# Patient Record
Sex: FEMALE | Race: WHITE | NOT HISPANIC OR LATINO | Employment: OTHER | URBAN - METROPOLITAN AREA
[De-identification: names, ages, dates, MRNs, and addresses within clinical notes are randomized per-mention and may not be internally consistent; named-entity substitution may affect disease eponyms.]

---

## 2017-01-14 ENCOUNTER — HOSPITAL ENCOUNTER (EMERGENCY)
Facility: HOSPITAL | Age: 82
Discharge: HOME | End: 2017-01-14
Attending: EMERGENCY MEDICINE
Payer: MEDICARE

## 2017-01-14 VITALS
DIASTOLIC BLOOD PRESSURE: 50 MMHG | HEART RATE: 84 BPM | TEMPERATURE: 99 F | WEIGHT: 130 LBS | SYSTOLIC BLOOD PRESSURE: 112 MMHG | HEIGHT: 65 IN | OXYGEN SATURATION: 98 % | RESPIRATION RATE: 26 BRPM | BODY MASS INDEX: 21.66 KG/M2

## 2017-01-14 DIAGNOSIS — Z20.828 EXPOSURE TO INFLUENZA: ICD-10-CM

## 2017-01-14 DIAGNOSIS — N28.9 RENAL INSUFFICIENCY: ICD-10-CM

## 2017-01-14 DIAGNOSIS — I48.20 CHRONIC ATRIAL FIBRILLATION: ICD-10-CM

## 2017-01-14 DIAGNOSIS — R11.2 NON-INTRACTABLE VOMITING WITH NAUSEA, UNSPECIFIED VOMITING TYPE: Primary | ICD-10-CM

## 2017-01-14 DIAGNOSIS — R05.9 COUGH: ICD-10-CM

## 2017-01-14 DIAGNOSIS — J81.1 CHRONIC PULMONARY EDEMA: ICD-10-CM

## 2017-01-14 DIAGNOSIS — D64.9 ANEMIA, UNSPECIFIED TYPE: ICD-10-CM

## 2017-01-14 DIAGNOSIS — R19.7 DIARRHEA, UNSPECIFIED TYPE: ICD-10-CM

## 2017-01-14 LAB
ALBUMIN SERPL BCP-MCNC: 3.2 G/DL
ALP SERPL-CCNC: 201 U/L
ALT SERPL W/O P-5'-P-CCNC: 10 U/L
ANION GAP SERPL CALC-SCNC: 12 MMOL/L
AST SERPL-CCNC: 18 U/L
BASOPHILS # BLD AUTO: 0.01 K/UL
BASOPHILS NFR BLD: 0.1 %
BILIRUB SERPL-MCNC: 1.3 MG/DL
BILIRUB UR QL STRIP: NEGATIVE
BUN SERPL-MCNC: 28 MG/DL
CALCIUM SERPL-MCNC: 9 MG/DL
CHLORIDE SERPL-SCNC: 102 MMOL/L
CLARITY UR: CLEAR
CO2 SERPL-SCNC: 24 MMOL/L
COLOR UR: YELLOW
CREAT SERPL-MCNC: 1.4 MG/DL
DIFFERENTIAL METHOD: ABNORMAL
EOSINOPHIL # BLD AUTO: 0 K/UL
EOSINOPHIL NFR BLD: 0 %
ERYTHROCYTE [DISTWIDTH] IN BLOOD BY AUTOMATED COUNT: 15.9 %
EST. GFR  (AFRICAN AMERICAN): 39 ML/MIN/1.73 M^2
EST. GFR  (NON AFRICAN AMERICAN): 34 ML/MIN/1.73 M^2
FLUAV AG SPEC QL IA: NEGATIVE
FLUBV AG SPEC QL IA: NEGATIVE
GLUCOSE SERPL-MCNC: 173 MG/DL
GLUCOSE UR QL STRIP: NEGATIVE
HCT VFR BLD AUTO: 34.2 %
HGB BLD-MCNC: 11.2 G/DL
HGB UR QL STRIP: NEGATIVE
KETONES UR QL STRIP: NEGATIVE
LEUKOCYTE ESTERASE UR QL STRIP: NEGATIVE
LYMPHOCYTES # BLD AUTO: 0.2 K/UL
LYMPHOCYTES NFR BLD: 2 %
MAGNESIUM SERPL-MCNC: 2.2 MG/DL
MCH RBC QN AUTO: 27.3 PG
MCHC RBC AUTO-ENTMCNC: 32.7 %
MCV RBC AUTO: 83 FL
MONOCYTES # BLD AUTO: 0.4 K/UL
MONOCYTES NFR BLD: 3.3 %
NEUTROPHILS # BLD AUTO: 10 K/UL
NEUTROPHILS NFR BLD: 94.6 %
NITRITE UR QL STRIP: NEGATIVE
PH UR STRIP: 6 [PH] (ref 5–8)
PLATELET # BLD AUTO: 210 K/UL
PMV BLD AUTO: 9.5 FL
POTASSIUM SERPL-SCNC: 4.3 MMOL/L
PROT SERPL-MCNC: 6.5 G/DL
PROT UR QL STRIP: NEGATIVE
RBC # BLD AUTO: 4.1 M/UL
SODIUM SERPL-SCNC: 138 MMOL/L
SP GR UR STRIP: 1.01 (ref 1–1.03)
SPECIMEN SOURCE: NORMAL
TROPONIN I SERPL DL<=0.01 NG/ML-MCNC: 0.02 NG/ML
URN SPEC COLLECT METH UR: NORMAL
UROBILINOGEN UR STRIP-ACNC: NEGATIVE EU/DL
WBC # BLD AUTO: 10.61 K/UL

## 2017-01-14 PROCEDURE — 25000003 PHARM REV CODE 250: Performed by: EMERGENCY MEDICINE

## 2017-01-14 PROCEDURE — 63600175 PHARM REV CODE 636 W HCPCS: Performed by: EMERGENCY MEDICINE

## 2017-01-14 PROCEDURE — 96361 HYDRATE IV INFUSION ADD-ON: CPT

## 2017-01-14 PROCEDURE — 96374 THER/PROPH/DIAG INJ IV PUSH: CPT

## 2017-01-14 PROCEDURE — 85025 COMPLETE CBC W/AUTO DIFF WBC: CPT

## 2017-01-14 PROCEDURE — 87400 INFLUENZA A/B EACH AG IA: CPT

## 2017-01-14 PROCEDURE — 83735 ASSAY OF MAGNESIUM: CPT

## 2017-01-14 PROCEDURE — 93010 ELECTROCARDIOGRAM REPORT: CPT | Mod: ,,, | Performed by: INTERNAL MEDICINE

## 2017-01-14 PROCEDURE — 84484 ASSAY OF TROPONIN QUANT: CPT

## 2017-01-14 PROCEDURE — 80053 COMPREHEN METABOLIC PANEL: CPT

## 2017-01-14 PROCEDURE — 99285 EMERGENCY DEPT VISIT HI MDM: CPT | Mod: 25

## 2017-01-14 PROCEDURE — 81003 URINALYSIS AUTO W/O SCOPE: CPT

## 2017-01-14 PROCEDURE — 93005 ELECTROCARDIOGRAM TRACING: CPT

## 2017-01-14 RX ORDER — CALCIUM CARB/VITAMIN D3/VIT K1 500-500-40
400 TABLET,CHEWABLE ORAL DAILY
COMMUNITY

## 2017-01-14 RX ORDER — ONDANSETRON 4 MG/1
4 TABLET, ORALLY DISINTEGRATING ORAL EVERY 6 HOURS PRN
Qty: 12 TABLET | Refills: 0 | Status: SHIPPED | OUTPATIENT
Start: 2017-01-14

## 2017-01-14 RX ORDER — OSELTAMIVIR PHOSPHATE 30 MG/1
30 CAPSULE ORAL DAILY
Qty: 10 CAPSULE | Refills: 0 | Status: SHIPPED | OUTPATIENT
Start: 2017-01-14 | End: 2017-01-24

## 2017-01-14 RX ORDER — ONDANSETRON 2 MG/ML
4 INJECTION INTRAMUSCULAR; INTRAVENOUS
Status: COMPLETED | OUTPATIENT
Start: 2017-01-14 | End: 2017-01-14

## 2017-01-14 RX ORDER — POTASSIUM CHLORIDE 20 MEQ/1
20 TABLET, EXTENDED RELEASE ORAL DAILY
COMMUNITY

## 2017-01-14 RX ORDER — FLUOXETINE 10 MG/1
10 CAPSULE ORAL DAILY
COMMUNITY

## 2017-01-14 RX ORDER — SODIUM CHLORIDE 9 MG/ML
1000 INJECTION, SOLUTION INTRAVENOUS
Status: DISCONTINUED | OUTPATIENT
Start: 2017-01-14 | End: 2017-01-14

## 2017-01-14 RX ADMIN — ONDANSETRON 4 MG: 2 INJECTION INTRAMUSCULAR; INTRAVENOUS at 10:01

## 2017-01-14 RX ADMIN — SODIUM CHLORIDE 500 ML: 0.9 INJECTION, SOLUTION INTRAVENOUS at 10:01

## 2017-01-14 NOTE — ED PROVIDER NOTES
SCRIBE #1 NOTE: IDorita, am scribing for, and in the presence of, Lita Carey DO. I have scribed the entire note.      History      Chief Complaint   Patient presents with    Nausea     vomiting and diarrhea x 1 day        Review of patient's allergies indicates:   Allergen Reactions    Codeine     Iodinated contrast media - iv dye         HPI   HPI    1/14/2017, 9:59 AM   History obtained from the adult caretaker, daughter and patient      History of Present Illness: Christy Palma is a 86 y.o. female patient who presents to the Emergency Department for N/V/D which onset suddenly at 1:45 am this morning. Daughter reports that patient had episodes of N/V/D every hour until 7:45 am this morning. Daughter describes the emesis as dark vomit. She reports that patient has a PMHx of C. Difficile and reports that patient recently finished a round of Bactrim for UTI. Daughter states that patient was exposed to the flu yesterday by family members. Patient has not received a flu vaccination. Symptoms are episodic and moderate in severity. No modifying factors reported. Daughter reports patient had tympanic temperatures of 99.4 and 100 this morning.  She had an episode of coughing after vomiting.  Pt and daughter report a cough but daughter denies chills, hematemesis, hematochezia, myalgias, HA, abd pain, CP, SOB, extremity weakness/numbness, and all other sxs at this time. No further complaints or concerns at this time.       Arrival mode: Personal vehicle    PCP: See Pollard MD       Past Medical History:  Past Medical History   Diagnosis Date    ASD (atrial septal defect)     Atrial fibrillation     Guillain-[place]     Hypertension     Lymphedema        Past Surgical History:  Past Surgical History   Procedure Laterality Date    Colectomy      Cholecystectomy      Hysterectomy      Cardiac surgery      Cardiac pacemaker placement           Family History:  History reviewed. No pertinent family  history.    Social History:  Social History     Social History Main Topics    Smoking status: Never Smoker    Smokeless tobacco: Unknown    Alcohol use No    Drug use: No    Sexual activity: Unknown       ROS   Review of Systems   Constitutional: Positive for fever (lowgrade, resolved). Negative for chills.   HENT: Negative for sore throat.    Respiratory: Positive for cough. Negative for shortness of breath.    Cardiovascular: Negative for chest pain.   Gastrointestinal: Positive for diarrhea, nausea and vomiting. Negative for abdominal pain, blood in stool and constipation.   Genitourinary: Negative for dysuria.   Musculoskeletal: Negative for back pain and myalgias.   Skin: Negative for rash.   Neurological: Negative for weakness, numbness and headaches.   Hematological: Does not bruise/bleed easily.   All other systems reviewed and are negative.      Physical Exam    Initial Vitals   BP Pulse Resp Temp SpO2   01/14/17 0943 01/14/17 0943 01/14/17 0943 01/14/17 0943 01/14/17 0943   126/76 114 18 98.5 °F (36.9 °C) 96 %      Physical Exam  Nursing Notes and Vital Signs Reviewed.  Constitutional: Patient is in no acute distress. Awake and alert. Well-developed and well-nourished.  Head: Atraumatic. Normocephalic.  Eyes: PERRL. EOM intact. Conjunctivae are not pale. No scleral icterus.  ENT: Mucous membranes are dry Oropharynx is clear and symmetric.    Neck: Supple. Full ROM. No lymphadenopathy.  Cardiovascular: Regular rate. Irregularly irregular rhythm with a murmur. No rubs or gallops. Distal pulses are 2+ and symmetric.  Pulmonary/Chest: No respiratory distress. Clear to auscultation bilaterally. No wheezing, rales, or rhonchi.  Abdominal: Soft and non-distended.  There is no tenderness.  No rebound, guarding, or rigidity.  Good bowel sounds.  Anasarca is located to the abdomen as well as lower extremities.  Genitourinary: No CVA tenderness  Musculoskeletal: Chronic deformity to RLE. No edema. No calf  "tenderness.  Skin: Warm and dry.  Neurological:  Alert, awake, and appropriate.  Normal speech.  No acute focal neurological deficits are appreciated.  Psychiatric: Normal affect. Good eye contact. Appropriate in content.    ED Course    Procedures  ED Vital Signs:  Vitals:    01/14/17 0943 01/14/17 0947 01/14/17 1033 01/14/17 1203   BP: 126/76  (!) 132/57 (!) 112/50   Pulse: (!) 114  83 84   Resp: 18  19 (!) 26   Temp: 98.5 °F (36.9 °C)      TempSrc: Oral      SpO2: 96%  99% 98%   Weight:  59 kg (130 lb)     Height:  5' 5" (1.651 m)         Abnormal Lab Results:  Labs Reviewed   CBC W/ AUTO DIFFERENTIAL - Abnormal; Notable for the following:        Result Value    Hemoglobin 11.2 (*)     Hematocrit 34.2 (*)     RDW 15.9 (*)     Gran # 10.0 (*)     Lymph # 0.2 (*)     Gran% 94.6 (*)     Lymph% 2.0 (*)     Mono% 3.3 (*)     All other components within normal limits   COMPREHENSIVE METABOLIC PANEL - Abnormal; Notable for the following:     Glucose 173 (*)     BUN, Bld 28 (*)     Albumin 3.2 (*)     Total Bilirubin 1.3 (*)     Alkaline Phosphatase 201 (*)     eGFR if  39 (*)     eGFR if non  34 (*)     All other components within normal limits   URINALYSIS   MAGNESIUM   TROPONIN I   INFLUENZA A AND B ANTIGEN        All Lab Results:  Results for orders placed or performed during the hospital encounter of 01/14/17   CBC auto differential   Result Value Ref Range    WBC 10.61 3.90 - 12.70 K/uL    RBC 4.10 4.00 - 5.40 M/uL    Hemoglobin 11.2 (L) 12.0 - 16.0 g/dL    Hematocrit 34.2 (L) 37.0 - 48.5 %    MCV 83 82 - 98 fL    MCH 27.3 27.0 - 31.0 pg    MCHC 32.7 32.0 - 36.0 %    RDW 15.9 (H) 11.5 - 14.5 %    Platelets 210 150 - 350 K/uL    MPV 9.5 9.2 - 12.9 fL    Gran # 10.0 (H) 1.8 - 7.7 K/uL    Lymph # 0.2 (L) 1.0 - 4.8 K/uL    Mono # 0.4 0.3 - 1.0 K/uL    Eos # 0.0 0.0 - 0.5 K/uL    Baso # 0.01 0.00 - 0.20 K/uL    Gran% 94.6 (H) 38.0 - 73.0 %    Lymph% 2.0 (L) 18.0 - 48.0 %    Mono% 3.3 (L) " 4.0 - 15.0 %    Eosinophil% 0.0 0.0 - 8.0 %    Basophil% 0.1 0.0 - 1.9 %    Differential Method Automated    Comprehensive metabolic panel   Result Value Ref Range    Sodium 138 136 - 145 mmol/L    Potassium 4.3 3.5 - 5.1 mmol/L    Chloride 102 95 - 110 mmol/L    CO2 24 23 - 29 mmol/L    Glucose 173 (H) 70 - 110 mg/dL    BUN, Bld 28 (H) 8 - 23 mg/dL    Creatinine 1.4 0.5 - 1.4 mg/dL    Calcium 9.0 8.7 - 10.5 mg/dL    Total Protein 6.5 6.0 - 8.4 g/dL    Albumin 3.2 (L) 3.5 - 5.2 g/dL    Total Bilirubin 1.3 (H) 0.1 - 1.0 mg/dL    Alkaline Phosphatase 201 (H) 55 - 135 U/L    AST 18 10 - 40 U/L    ALT 10 10 - 44 U/L    Anion Gap 12 8 - 16 mmol/L    eGFR if African American 39 (A) >60 mL/min/1.73 m^2    eGFR if non African American 34 (A) >60 mL/min/1.73 m^2   Urinalysis   Result Value Ref Range    Specimen UA Urine, Catheterized     Color, UA Yellow Yellow, Straw, Olga    Appearance, UA Clear Clear    pH, UA 6.0 5.0 - 8.0    Specific Gravity, UA 1.015 1.005 - 1.030    Protein, UA Negative Negative    Glucose, UA Negative Negative    Ketones, UA Negative Negative    Bilirubin (UA) Negative Negative    Occult Blood UA Negative Negative    Nitrite, UA Negative Negative    Urobilinogen, UA Negative <2.0 EU/dL    Leukocytes, UA Negative Negative   Magnesium   Result Value Ref Range    Magnesium 2.2 1.6 - 2.6 mg/dL   Troponin I   Result Value Ref Range    Troponin I 0.020 0.000 - 0.026 ng/mL   Influenza antigen Nasopharyngeal Swab   Result Value Ref Range    Influenza A Ag, EIA Negative Negative    Influenza B Ag, EIA Negative Negative    Flu A & B Source Nasopharyngeal Swab      Imaging Results:  Imaging Results         CT Abdomen Pelvis  Without Contrast (Final result) Result time:  01/14/17 11:23:08    Final result by John Quiroz MD (01/14/17 11:23:08)    Impression:             1.  Large bilateral pleural effusions with compressive atelectasis of the basilar segments of the lower lobes, left greater than right.  No  megaly and interstitial pulmonary edema is seen with congestive heart failure.  2.  No acute inflammatory change in the abdomen or pelvis.  3.  No bowel obstruction.  4.  Atrophic left kidney.  5.  3rd spacing of fluid in the subcutaneous soft tissues of the abdominal wall.    All CT scans at this facility use dose modulation, iterative reconstruction and/or weight based dosing when appropriate to reduce radiation dose to as low as reasonably achievable.      Electronically signed by: JOHN BROWN MD  Date:     01/14/17  Time:    11:23     Narrative:    Exam: CT abdomen and pelvis without intravenous contrast.    Technique: Axial CT images performed through the abdomen and pelvis without intravenous contrast. Multiplanar reformats were performed and interpreted.    Comparison: None    Clinical History: vomiting, fever, diarrhea.   Abdominal pain    Findings:         There are large bilateral pleural effusions with compressive atelectasis of the basilar segments of the lower lobes, left greater than right.  Interstitial pulmonary edema is present.  There is cardiomegaly.    No obstructing urolithiasis or hydronephrosis.  Left kidney is atrophic.  There is a tiny nonobstructing calculus in the lower pole of the atrophic left kidney.  No mass lesion is seen in the abdominal solid organs.   Cholecystectomy clips are noted.  No free fluid, free air, or inflammatory change.     No bowel obstruction.    The abdominal aorta is normal in caliber.  There is calcific atherosclerotic disease abdominal aorta and its branches.    The urinary bladder is unremarkable. There is 3rd spacing of fluid throughout the abdominal wall subcutaneous soft tissues.    There is a levoscoliosis of the thoracolumbar junction.  Anterior wedge compression fracture deformities are noted of T12 and L1, age-indeterminate but appear probably old.            X-Ray Chest AP Portable (Final result) Result time:  01/14/17 10:51:49    Final result by John FAUSTIN  MD Richie (01/14/17 10:51:49)    Impression:     See above            Electronically signed by: TREASURE BROWN MD  Date:     01/14/17  Time:    10:51     Narrative:    Exam: Portable chest radiograph    Clinical History:   .  Cough     Comparison: Chest x-ray, 03/28/2014.    Findings:.     There are small bilateral pleural effusions.  No pneumothorax.  There is a left-sided dual-lead AICD. Heart size is enlarged, stable. There is chronic coarsening of the bronchovascular interstitium.  Superimposed pulmonary edema is not excluded.             The EKG was ordered, reviewed, and independently interpreted by the ED provider.  Interpretation time: 10:27  Rate: 84 BPM  Rhythm: atrial fibrillation  Interpretation: Rightward axis. Nonspecific T wave abnormality. No STEMI.      The Emergency Provider reviewed the vital signs and test results, which are outlined above.    ED Discussion     12:01 PM: Dr. Carey discussed in depth the CXR and lab results and treatment of Pulmonary edema with patient's daughter. Discussed risks and benefits of giving IV Lasix. D/w daughter giving patient Lasix could cause worsening of renal insufficiency. Decided to not give patient Lasix at this time continue home dose., patient is not in respiratory distress. Daughter informed to watch for respiratory distress. D/w patient's daughter all concerns at this time. No further questions or concerns.  Discussed low-sodium diet.    I discussed with patient and/or family/caretaker that evaluation in the ED does not suggest any emergent or life threatening medical conditions requiring immediate intervention beyond what was provided in the ED, and I believe patient is safe for discharge.  Regardless, an unremarkable evaluation in the ED does not preclude the development or presence of a serious of life threatening condition. As such, patient was instructed to return immediately for any worsening or change in current symptoms.    I discussed with  patient that evaluation in the ED does not suggest any emergent or life threatening medical conditions requiring immediate intervention beyond what was provided in the ED, and I believe patient is safe for discharge.  Regardless, an unremarkable evaluation in the ED does not preclude the development or presence of a serious of life threatening condition. As such, patient was instructed to return immediately for any worsening or change in current symptoms.      ED Medication(s):  Medications   sodium chloride 0.9% bolus 500 mL (0 mLs Intravenous Stopped 1/14/17 1155)   ondansetron injection 4 mg (4 mg Intravenous Given 1/14/17 1040)       Discharge Medication List as of 1/14/2017  1:01 PM      START taking these medications    Details   ondansetron (ZOFRAN-ODT) 4 MG TbDL Take 1 tablet (4 mg total) by mouth every 6 (six) hours as needed., Starting 1/14/2017, Until Discontinued, Print      oseltamivir (TAMIFLU) 30 MG capsule Take 1 capsule (30 mg total) by mouth once daily., Starting 1/14/2017, Until Tue 1/24/17, Print             Follow-up Information     Follow up with See Pollard MD. Schedule an appointment as soon as possible for a visit in 1 day.    Specialty:  Internal Medicine    Why:  Low salt diet.   [place] diet.  Frequent sips of pedialyte.  Return to the ED for:  nausea, vomiting, fever, difficulty breathing, weakness, difficulty awakening or other concerns.     Contact information:    [address] [facility] [address] [phone]             Medical Decision Making    Medical Decision Making:   Clinical Tests:   Lab Tests: Ordered and Reviewed  Radiological Study: Ordered and Reviewed  Medical Tests: Ordered and Reviewed           Scribe Attestation:   Scribe #1: I performed the above scribed service and the documentation accurately describes the services I performed. I attest to the accuracy of the note.    Attending:   Physician Attestation Statement for Scribe #1: ILita DO,  personally performed the services described in this documentation, as scribed by Dorita Wilson, in my presence, and it is both accurate and complete.          Clinical Impression       ICD-10-CM ICD-9-CM   1. Non-intractable vomiting with nausea, unspecified vomiting type R11.2 787.01   2. Cough R05 786.2   3. Diarrhea, unspecified type R19.7 787.91   4. Anemia, unspecified type D64.9 285.9   5. Chronic pulmonary edema J81.1 514   6. Renal insufficiency N28.9 593.9   7. Exposure to influenza Z20.828 V01.79   8. Chronic atrial fibrillation I48.2 427.31       Disposition:   Disposition: Discharged  Condition: Stable         Lita Carey,   01/14/17 181

## 2017-01-14 NOTE — ED NOTES
[facility] has arrived for pt transport. Specimen cup has been given to family and labeled. Explained to pt family how to collect and where to bring stool specimen. Verbalized understanding.

## 2017-01-14 NOTE — ED PROVIDER NOTES
SCRIBE #1 NOTE: I, Darcie Ruano, am scribing for, and in the presence of, No att. providers found. I have scribed the entire note.      History      Chief Complaint   Patient presents with    Nausea     vomiting and diarrhea x 1 day        Review of patient's allergies indicates:   Allergen Reactions    Codeine     Iodinated contrast media - iv dye         HPI   HPI    1/14/2017, 9:44 AM   History obtained from the patient      History of Present Illness: Christy Palma is a 86 y.o. female patient who presents to the Emergency Department for nausea which onset gradually***. Symptoms are *** and *** in severity. *** is located to ***. The patient describes the sxs as ***. No mitigating or exacerbating factors reported. Associated sxs include ***. Patient denies any ***, and all other sxs at this time. Prior Tx includes ***. No further complaints or concerns at this time.         Arrival mode: Personal vehicle    PCP: See Pollard MD       Past Medical History:  Past Medical History   Diagnosis Date    ASD (atrial septal defect)     Hypertension        Past Surgical History:  Past Surgical History   Procedure Laterality Date    Coronary artery bypass graft      Colectomy      Cholecystectomy      Hysterectomy      Cardiac surgery      Cardiac pacemaker placement           Family History:  No family history on file.    Social History:  Social History     Social History Main Topics    Smoking status: Never Smoker    Smokeless tobacco: Not on file    Alcohol use No    Drug use: No    Sexual activity: Not on file       ROS   Review of Systems   Constitutional: Negative for fever.   HENT: Negative for sore throat.    Respiratory: Negative for shortness of breath.    Cardiovascular: Negative for chest pain.   Gastrointestinal: Positive for nausea.   Genitourinary: Negative for dysuria.   Musculoskeletal: Negative for back pain.   Skin: Negative for rash.   Neurological: Negative for weakness.   Hematological:  Does not bruise/bleed easily.       Physical Exam    Initial Vitals   BP Pulse Resp Temp SpO2   01/14/17 0943 01/14/17 0943 01/14/17 0943 01/14/17 0943 01/14/17 0943   126/76 114 18 98.5 °F (36.9 °C) 96 %      Physical Exam  Nursing Notes and Vital Signs Reviewed.  Constitutional: Patient is in {DISTRESS LEVEL:35987}. Awake and alert. Well-developed and well-nourished.  Head: Atraumatic. Normocephalic.  Eyes: PERRL. EOM intact. Conjunctivae are not pale. No scleral icterus.  ENT: Mucous membranes are moist. Oropharynx is clear and symmetric***.    Neck: Supple. Full ROM. No lymphadenopathy.  Cardiovascular: Regular rate. Regular rhythm***. No murmurs, rubs, or gallops. Distal pulses are 2+ and symmetric***.  Pulmonary/Chest: No respiratory distress. Clear to auscultation bilaterally***. No wheezing, rales, or rhonchi.  Abdominal: Soft and non-distended.  There is no tenderness.  No rebound, guarding, or rigidity.  Good bowel sounds***.    Genitourinary: No*** CVA tenderness  Musculoskeletal: Moves all extremities. No obvious deformities. No edema. No calf tenderness***.  Skin: Warm and dry.  Neurological:  Alert, awake, and appropriate.  Normal speech.  No acute focal neurological deficits are appreciated.  Psychiatric: Normal affect. Good eye contact. Appropriate in content.    ED Course    Procedures  ED Vital Signs:  Vitals:    01/14/17 0943   BP: 126/76   Pulse: (!) 114   Resp: 18   Temp: 98.5 °F (36.9 °C)   TempSrc: Oral   SpO2: 96%       Abnormal Lab Results:  Labs Reviewed - No data to display     All Lab Results:  ***    Imaging Results:  Imaging Results     None                  The Emergency Provider reviewed the vital signs and test results, which are outlined above.    ED Discussion     ***    ED Medication(s):  Medications - No data to display    New Prescriptions    No medications on file             Medical Decision Making              Scribe Attestation:   Scribe #1: I performed the above scribed  service and the documentation accurately describes the services I performed. I attest to the accuracy of the note.    Attending:   Physician Attestation Statement for Scribe #1: I, No att. providers found, personally performed the services described in this documentation, as scribed by Darcie Ruano, in my presence, and it is both accurate and complete.          Clinical Impression     No diagnosis found.

## 2017-01-14 NOTE — ED AVS SNAPSHOT
[facility]  [id number] [facility] [id number]                [place]   2017  9:40 AM   ED    Description:  Female : 10/15/1930   Department:  [facility]            Your Care was Coordinated By:     Provider Role From To    Lita Carey DO Attending Provider 17 1002 --      Reason for Visit     Nausea           Diagnoses this Visit        Comments    Non-intractable vomiting with nausea, unspecified vomiting type    -  Primary     Cough         Diarrhea, unspecified type         Anemia, unspecified type         Chronic pulmonary edema         Renal insufficiency         Exposure to influenza           ED Disposition     ED Disposition Condition Comment    Discharge             To Do List           Follow-up Information     Follow up with See Pollard MD. Schedule an appointment as soon as possible for a visit in 1 day.    Specialty:  Internal Medicine    Why:  Low salt diet.   [place] diet.  Frequent sips of pedialyte.  Return to the ED for:  nausea, vomiting, fever, difficulty breathing, weakness, difficulty awakening or other concerns.     Contact information:    [address] [address] [phone]         These Medications        Disp Refills Start End    ondansetron (ZOFRAN-ODT) 4 MG TbDL 12 tablet 0 2017     Take 1 tablet (4 mg total) by mouth every 6 (six) hours as needed. - Oral    oseltamivir (TAMIFLU) 30 MG capsule 10 capsule 0 2017    Take 1 capsule (30 mg total) by mouth once daily. - Oral      [facility]     [facility] On Call [facility] Care Line -  Assistance  Registered nurses in the [facility] provide clinical advisement, health education, appointment booking, and other advisory services.  Call for this free service at [phone].             Medications           Message regarding Medications     Verify the changes and/or additions to your medication  regime listed below are the same as discussed with your clinician today.  If any of these changes or additions are incorrect, please notify your healthcare provider.        START taking these NEW medications        Refills    ondansetron (ZOFRAN-ODT) 4 MG TbDL 0    Sig: Take 1 tablet (4 mg total) by mouth every 6 (six) hours as needed.    Class: Print    Route: Oral    oseltamivir (TAMIFLU) 30 MG capsule 0    Sig: Take 1 capsule (30 mg total) by mouth once daily.    Class: Print    Route: Oral      These medications were administered today        Dose Freq    sodium chloride 0.9% bolus 500 mL 500 mL ED 1 Time    Sig: Inject 500 mLs into the vein ED 1 Time.    Class: Normal    Route: Intravenous    ondansetron injection 4 mg 4 mg ED 1 Time    Sig: Inject 4 mg into the vein ED 1 Time.    Class: Normal    Route: Intravenous      STOP taking these medications     vitamin D 1000 units Tab Take 185 mg by mouth once daily.    warfarin (COUMADIN) 3 MG tablet Take 3 mg by mouth once daily.           Verify that the below list of medications is an accurate representation of the medications you are currently taking.  If none reported, the list may be blank. If incorrect, please contact your healthcare provider. Carry this list with you in case of emergency.           Current Medications     apixaban (ELIQUIS) 2.5 mg Tab Take by mouth 2 (two) times daily.    cholecalciferol, vitamin D3, (VITAMIN D3) 400 unit Cap Take 400 Units by mouth once daily.    fluoxetine (PROZAC) 10 MG capsule Take 10 mg by mouth once daily.    furosemide (LASIX) 40 MG tablet Take 20 mg by mouth 2 (two) times daily.     metoprolol tartrate (LOPRESSOR) 50 MG tablet Take 50 mg by mouth 2 (two) times daily.     potassium chloride SA (K-DUR,KLOR-CON) 20 MEQ tablet Take 20 mEq by mouth once daily.    ondansetron (ZOFRAN-ODT) 4 MG TbDL Take 1 tablet (4 mg total) by mouth every 6 (six) hours as needed.    oseltamivir (TAMIFLU) 30 MG capsule Take 1 capsule (30  "mg total) by mouth once daily.           Clinical Reference Information           Your Vitals Were     BP Pulse Temp Resp Height Weight    112/50 84 98.5 °F (36.9 °C) (Oral) 26 5' 5" (1.651 m) 59 kg (130 lb)    SpO2 BMI             98% 21.63 kg/m2         Allergies as of 1/14/2017        Reactions    Codeine     Iodinated Contrast Media - Iv Dye       Immunizations Administered on Date of Encounter - 1/14/2017     None      ED Micro, Lab, POCT     Start Ordered       Status Ordering Provider    01/14/17 1022 01/14/17 1021    Once,   Status:  Canceled      Canceled     01/14/17 1022 01/14/17 1021    STAT,   Status:  Canceled      Canceled     01/14/17 1021 01/14/17 1021  Troponin I  STAT      Final result     01/14/17 1021 01/14/17 1021    STAT,   Status:  Canceled      Canceled     01/14/17 1021 01/14/17 1021    Once,   Status:  Canceled      Canceled     01/14/17 1021 01/14/17 1021  Influenza antigen Nasopharyngeal Swab  Once      Final result     01/14/17 1019 01/14/17 1019  Urinalysis  STAT      Final result     01/14/17 1019 01/14/17 1019  Magnesium  STAT      Final result     01/14/17 1018 01/14/17 1019  CBC auto differential  STAT      Final result     01/14/17 1018 01/14/17 1019  Comprehensive metabolic panel  STAT      Final result     01/14/17 0000 01/14/17 1256  CLOSTRIDIUM DIFFICILE      Ordered     01/14/17 0000 01/14/17 1256  Occult blood x 1, stool      Ordered     01/14/17 0000 01/14/17 1256  WBC, Stool      Ordered     01/14/17 0000 01/14/17 1256  CULTURE, STOOL      Ordered       ED Imaging Orders     Start Ordered       Status Ordering Provider    01/14/17 1020 01/14/17 1019  X-Ray Chest AP Portable  1 time imaging      Final result     01/14/17 1020 01/14/17 1019  CT Abdomen Pelvis  Without Contrast  1 time imaging      Final result       Discharge References/Attachments     ANEMIA (ENGLISH)    RENAL INSUFFICIENCY (ENGLISH)    HEART FAILURE, CONGESTIVE (ENGLISH)    HEART FAILURE: MAKING CHANGES TO " YOUR DIET (ENGLISH)    DIET, VOMITING OR DIARRHEA (ADULT) (ENGLISH)    ONDANSETRON ORAL DISINTEGRATING TABLET (ENGLISH)    OSELTAMIVIR PHOSPHATE ORAL CAPSULE (ENGLISH)       [facility] complies with applicable Federal civil rights laws and does not discriminate on the basis of race, color, national origin, age, disability, or sex.        Language Assistance Services     ATTENTION: Language assistance services are available, free of charge. Please call [phone].      ATENCIÓN: Si habla andresañol, tiene a snyder disposición servicios gratuitos de asistencia lingüística. Llame al [phone].     CHÚ Ý: N?u b?n nói Ti?ng Vi?t, có các d?ch v? h? tr? ngôn ng? mi?n phí dành cho b?n. G?i s? [phone].

## 2017-01-14 NOTE — ED NOTES
Pt able to tolerate po challenge. No n/v.  Asked family about transportation home. States they can not transport and pt uses a wheelchair at home. Talked with charge nurse and family and they agree to use [employer] to send pt home. Requested paperwork faxed to [employer]. Awaiting reply

## 2017-01-14 NOTE — ED TRIAGE NOTES
The history is provided by the pt's daughter; pt had multiple episodes of vomiting and diarrhea starting at 0115 this morning.  Pmhx c.diff, pt just finished course of bactrim to treat UTI.

## 2017-03-17 ENCOUNTER — LAB VISIT (OUTPATIENT)
Dept: LAB | Facility: HOSPITAL | Age: 82
End: 2017-03-17
Attending: INTERNAL MEDICINE
Payer: MEDICARE

## 2017-03-17 DIAGNOSIS — N39.0 URINARY TRACT INFECTION, SITE NOT SPECIFIED: Primary | ICD-10-CM

## 2017-03-17 PROCEDURE — 87186 SC STD MICRODIL/AGAR DIL: CPT

## 2017-03-17 PROCEDURE — 87086 URINE CULTURE/COLONY COUNT: CPT

## 2017-03-17 PROCEDURE — 87088 URINE BACTERIA CULTURE: CPT

## 2017-03-17 PROCEDURE — 87077 CULTURE AEROBIC IDENTIFY: CPT

## 2017-03-20 LAB — BACTERIA UR CULT: NORMAL

## 2022-03-20 NOTE — ED TRIAGE NOTES
Family states pt began having diarrhea and vomiting at 0100 this morning. States pt recently finished a prescription for bactrim     DIFFICULTY URINATING